# Patient Record
Sex: MALE | Race: WHITE | NOT HISPANIC OR LATINO | URBAN - METROPOLITAN AREA
[De-identification: names, ages, dates, MRNs, and addresses within clinical notes are randomized per-mention and may not be internally consistent; named-entity substitution may affect disease eponyms.]

---

## 2018-04-02 ENCOUNTER — EMERGENCY (EMERGENCY)
Facility: HOSPITAL | Age: 63
LOS: 1 days | Discharge: HOME | End: 2018-04-02
Attending: EMERGENCY MEDICINE | Admitting: EMERGENCY MEDICINE
Payer: COMMERCIAL

## 2018-04-02 VITALS
TEMPERATURE: 98 F | HEART RATE: 98 BPM | WEIGHT: 240.08 LBS | OXYGEN SATURATION: 97 % | HEIGHT: 71 IN | DIASTOLIC BLOOD PRESSURE: 99 MMHG | RESPIRATION RATE: 16 BRPM | SYSTOLIC BLOOD PRESSURE: 179 MMHG

## 2018-04-02 VITALS
RESPIRATION RATE: 16 BRPM | OXYGEN SATURATION: 96 % | HEART RATE: 65 BPM | DIASTOLIC BLOOD PRESSURE: 92 MMHG | TEMPERATURE: 98 F | SYSTOLIC BLOOD PRESSURE: 149 MMHG

## 2018-04-02 DIAGNOSIS — Z98.890 OTHER SPECIFIED POSTPROCEDURAL STATES: Chronic | ICD-10-CM

## 2018-04-02 PROCEDURE — 73562 X-RAY EXAM OF KNEE 3: CPT | Mod: 26,RT

## 2018-04-02 PROCEDURE — 99283 EMERGENCY DEPT VISIT LOW MDM: CPT | Mod: 25

## 2018-04-02 PROCEDURE — 73562 X-RAY EXAM OF KNEE 3: CPT

## 2018-04-02 PROCEDURE — 99284 EMERGENCY DEPT VISIT MOD MDM: CPT

## 2018-04-02 RX ORDER — IBUPROFEN 200 MG
600 TABLET ORAL ONCE
Qty: 0 | Refills: 0 | Status: COMPLETED | OUTPATIENT
Start: 2018-04-02 | End: 2018-04-02

## 2018-04-02 RX ADMIN — Medication 600 MILLIGRAM(S): at 10:20

## 2018-04-02 RX ADMIN — Medication 600 MILLIGRAM(S): at 11:00

## 2018-04-02 NOTE — ED PROVIDER NOTE - PROGRESS NOTE DETAILS
xray reviewed. results reviewed with pt including abnormal results. pt given a copy of results. pt without tenderness over fibula. pt advised he is going to follow up with ortho today in [facility]. pt advised to RICE, pt advised to take motrin for pain. pt denies cane or knee immobilizer.  All questions answered and concerns addressed. pt verbalized understanding and agreement with plan and dx. pt advised to follow up with PMD. pt advised to return to ed for worsening symptoms including fever, cp, sob. will dc.

## 2018-04-02 NOTE — ED PROVIDER NOTE - MEDICAL DECISION MAKING DETAILS
xray and ibuprofen. pt advised he must follow up with ortho as this is a probable ligamentous injury.

## 2018-04-02 NOTE — ED PROVIDER NOTE - ATTENDING CONTRIBUTION TO CARE
I, Dr Kaur, have personally performed a face to face diagnostic evaluation on this patient with the PA/NP. I have reviewed the PA/NP's note and agree with the history, Physical exam and plan of care, As per history pt is a 61yo male with pmhx of R arthroscopic knee surgery c/o knee pain x 4 days. pt reports at work he was stepping up into a truck and his R foot got caught in the grate causing right knee pain. pt reports non radiating burning pain rated 4/10 worse when sitting and sleeping, better with walking. pt used ice and took aleve for symptoms causing minimal relief. pt reports previous hx of knee surgery in this extremity. pt denies fever, cp, sob, numbness or tingling of extremity. nkda Review of Xray negative for fracture will discharge to fallow up with Orthopedic.

## 2018-04-02 NOTE — ED PROVIDER NOTE - PHYSICAL EXAMINATION
MS R LE: +RIGHT KNEE WITH MINIMAL SWELLING WITHOUT EFFUSION. LATERAL SUPERIOR KNEE TTP. FROM OF EXTREMITY. SENSATION GROSSLY INTACT. NO JOINT LAXITY   PV:+ 2 DP. CAP REFILL < 2 SECONDS. NO CALF SWELLING. NO CALF TENDERNESS

## 2018-04-02 NOTE — ED ADULT NURSE NOTE - OBJECTIVE STATEMENT
Patient walked into ER c/o "pain to lateral aspect of right knee" injured 4 days ago when he climbed into a big truck.  Burning sensation noted, patient walking without any deficit.

## 2018-04-02 NOTE — ED PROVIDER NOTE - CARE PLAN
Principal Discharge DX:	Knee pain  Secondary Diagnosis:	Sprain of collateral ligament of right knee, initial encounter

## 2018-04-02 NOTE — ED PROVIDER NOTE - OBJECTIVE STATEMENT
pt is a 63yo male with pmhx of R arthroscopic knee surgery c/o knee pain x 4 days. pt reports at work he was stepping up into a truck and pt is a 63yo male with pmhx of R arthroscopic knee surgery c/o knee pain x 4 days. pt reports at work he was stepping up into a truck and his R foot got caught in the grate causing right knee pain. pt reports non radiating burning pain rated 4/10 worse when sitting and sleeping, better with walking. pt used ice and took aleve for symptoms causing minimal relief. pt reports previous hx of knee surgery in this extremity. pt denies fever, cp, sob, numbness or tingling of extremity. nkda

## 2021-02-19 ENCOUNTER — INPATIENT (INPATIENT)
Facility: HOSPITAL | Age: 66
LOS: 0 days | Discharge: HOME | DRG: 185 | End: 2021-02-19
Attending: SURGERY | Admitting: SURGERY
Payer: COMMERCIAL

## 2021-02-19 VITALS
OXYGEN SATURATION: 97 % | DIASTOLIC BLOOD PRESSURE: 93 MMHG | TEMPERATURE: 97 F | RESPIRATION RATE: 20 BRPM | SYSTOLIC BLOOD PRESSURE: 178 MMHG | HEART RATE: 70 BPM

## 2021-02-19 VITALS
SYSTOLIC BLOOD PRESSURE: 141 MMHG | OXYGEN SATURATION: 96 % | DIASTOLIC BLOOD PRESSURE: 67 MMHG | HEART RATE: 56 BPM | RESPIRATION RATE: 18 BRPM

## 2021-02-19 DIAGNOSIS — S22.42XA MULTIPLE FRACTURES OF RIBS, LEFT SIDE, INITIAL ENCOUNTER FOR CLOSED FRACTURE: ICD-10-CM

## 2021-02-19 DIAGNOSIS — Z98.890 OTHER SPECIFIED POSTPROCEDURAL STATES: Chronic | ICD-10-CM

## 2021-02-19 LAB
ALBUMIN SERPL ELPH-MCNC: 4 G/DL — SIGNIFICANT CHANGE UP (ref 3.3–5.2)
ALP SERPL-CCNC: 67 U/L — SIGNIFICANT CHANGE UP (ref 40–120)
ALT FLD-CCNC: 21 U/L — SIGNIFICANT CHANGE UP
ANION GAP SERPL CALC-SCNC: 8 MMOL/L — SIGNIFICANT CHANGE UP (ref 5–17)
APTT BLD: 33.8 SEC — SIGNIFICANT CHANGE UP (ref 27.5–35.5)
AST SERPL-CCNC: 26 U/L — SIGNIFICANT CHANGE UP
BASOPHILS # BLD AUTO: 0.11 K/UL — SIGNIFICANT CHANGE UP (ref 0–0.2)
BASOPHILS NFR BLD AUTO: 1.1 % — SIGNIFICANT CHANGE UP (ref 0–2)
BILIRUB SERPL-MCNC: 1.3 MG/DL — SIGNIFICANT CHANGE UP (ref 0.4–2)
BLOOD GAS COMMENTS ARTERIAL: SIGNIFICANT CHANGE UP
BUN SERPL-MCNC: 21 MG/DL — HIGH (ref 8–20)
CALCIUM SERPL-MCNC: 8.9 MG/DL — SIGNIFICANT CHANGE UP (ref 8.6–10.2)
CHLORIDE SERPL-SCNC: 105 MMOL/L — SIGNIFICANT CHANGE UP (ref 98–107)
CO2 SERPL-SCNC: 26 MMOL/L — SIGNIFICANT CHANGE UP (ref 22–29)
CREAT SERPL-MCNC: 1.04 MG/DL — SIGNIFICANT CHANGE UP (ref 0.5–1.3)
EOSINOPHIL # BLD AUTO: 0.27 K/UL — SIGNIFICANT CHANGE UP (ref 0–0.5)
EOSINOPHIL NFR BLD AUTO: 2.6 % — SIGNIFICANT CHANGE UP (ref 0–6)
GAS PNL BLDA: SIGNIFICANT CHANGE UP
GLUCOSE SERPL-MCNC: 109 MG/DL — HIGH (ref 70–99)
HCO3 BLDA-SCNC: 24 MMOL/L — SIGNIFICANT CHANGE UP (ref 20–26)
HCT VFR BLD CALC: 45.9 % — SIGNIFICANT CHANGE UP (ref 39–50)
HGB BLD-MCNC: 15.6 G/DL — SIGNIFICANT CHANGE UP (ref 13–17)
HOROWITZ INDEX BLDA+IHG-RTO: SIGNIFICANT CHANGE UP
IMM GRANULOCYTES NFR BLD AUTO: 0.3 % — SIGNIFICANT CHANGE UP (ref 0–1.5)
INR BLD: 1.09 RATIO — SIGNIFICANT CHANGE UP (ref 0.88–1.16)
LYMPHOCYTES # BLD AUTO: 1.04 K/UL — SIGNIFICANT CHANGE UP (ref 1–3.3)
LYMPHOCYTES # BLD AUTO: 10 % — LOW (ref 13–44)
MCHC RBC-ENTMCNC: 30.6 PG — SIGNIFICANT CHANGE UP (ref 27–34)
MCHC RBC-ENTMCNC: 34 GM/DL — SIGNIFICANT CHANGE UP (ref 32–36)
MCV RBC AUTO: 90.2 FL — SIGNIFICANT CHANGE UP (ref 80–100)
MONOCYTES # BLD AUTO: 1.08 K/UL — HIGH (ref 0–0.9)
MONOCYTES NFR BLD AUTO: 10.4 % — SIGNIFICANT CHANGE UP (ref 2–14)
NEUTROPHILS # BLD AUTO: 7.84 K/UL — HIGH (ref 1.8–7.4)
NEUTROPHILS NFR BLD AUTO: 75.6 % — SIGNIFICANT CHANGE UP (ref 43–77)
PCO2 BLDA: 40 MMHG — SIGNIFICANT CHANGE UP (ref 35–45)
PH BLDA: 7.4 — SIGNIFICANT CHANGE UP (ref 7.35–7.45)
PLATELET # BLD AUTO: 286 K/UL — SIGNIFICANT CHANGE UP (ref 150–400)
PO2 BLDA: 70 MMHG — LOW (ref 83–108)
POTASSIUM SERPL-MCNC: 4.4 MMOL/L — SIGNIFICANT CHANGE UP (ref 3.5–5.3)
POTASSIUM SERPL-SCNC: 4.4 MMOL/L — SIGNIFICANT CHANGE UP (ref 3.5–5.3)
PROT SERPL-MCNC: 7.2 G/DL — SIGNIFICANT CHANGE UP (ref 6.6–8.7)
PROTHROM AB SERPL-ACNC: 12.6 SEC — SIGNIFICANT CHANGE UP (ref 10.6–13.6)
RBC # BLD: 5.09 M/UL — SIGNIFICANT CHANGE UP (ref 4.2–5.8)
RBC # FLD: 13 % — SIGNIFICANT CHANGE UP (ref 10.3–14.5)
SAO2 % BLDA: 95 % — SIGNIFICANT CHANGE UP (ref 95–99)
SARS-COV-2 IGG SERPL QL IA: NEGATIVE — SIGNIFICANT CHANGE UP
SARS-COV-2 IGM SERPL IA-ACNC: 0.09 INDEX — SIGNIFICANT CHANGE UP
SARS-COV-2 RNA SPEC QL NAA+PROBE: SIGNIFICANT CHANGE UP
SODIUM SERPL-SCNC: 139 MMOL/L — SIGNIFICANT CHANGE UP (ref 135–145)
TROPONIN T SERPL-MCNC: <0.01 NG/ML — SIGNIFICANT CHANGE UP (ref 0–0.06)
WBC # BLD: 10.37 K/UL — SIGNIFICANT CHANGE UP (ref 3.8–10.5)
WBC # FLD AUTO: 10.37 K/UL — SIGNIFICANT CHANGE UP (ref 3.8–10.5)

## 2021-02-19 PROCEDURE — 72192 CT PELVIS W/O DYE: CPT

## 2021-02-19 PROCEDURE — 99284 EMERGENCY DEPT VISIT MOD MDM: CPT

## 2021-02-19 PROCEDURE — 72192 CT PELVIS W/O DYE: CPT | Mod: 26,MA

## 2021-02-19 PROCEDURE — 85610 PROTHROMBIN TIME: CPT

## 2021-02-19 PROCEDURE — 84484 ASSAY OF TROPONIN QUANT: CPT

## 2021-02-19 PROCEDURE — 73130 X-RAY EXAM OF HAND: CPT

## 2021-02-19 PROCEDURE — 99239 HOSP IP/OBS DSCHRG MGMT >30: CPT

## 2021-02-19 PROCEDURE — 71045 X-RAY EXAM CHEST 1 VIEW: CPT | Mod: 26

## 2021-02-19 PROCEDURE — G1004: CPT

## 2021-02-19 PROCEDURE — 82803 BLOOD GASES ANY COMBINATION: CPT

## 2021-02-19 PROCEDURE — 96374 THER/PROPH/DIAG INJ IV PUSH: CPT

## 2021-02-19 PROCEDURE — 36415 COLL VENOUS BLD VENIPUNCTURE: CPT

## 2021-02-19 PROCEDURE — U0005: CPT

## 2021-02-19 PROCEDURE — 96375 TX/PRO/DX INJ NEW DRUG ADDON: CPT

## 2021-02-19 PROCEDURE — 71250 CT THORAX DX C-: CPT

## 2021-02-19 PROCEDURE — 71045 X-RAY EXAM CHEST 1 VIEW: CPT

## 2021-02-19 PROCEDURE — 86769 SARS-COV-2 COVID-19 ANTIBODY: CPT

## 2021-02-19 PROCEDURE — 85025 COMPLETE CBC W/AUTO DIFF WBC: CPT

## 2021-02-19 PROCEDURE — 73030 X-RAY EXAM OF SHOULDER: CPT

## 2021-02-19 PROCEDURE — 80053 COMPREHEN METABOLIC PANEL: CPT

## 2021-02-19 PROCEDURE — 85730 THROMBOPLASTIN TIME PARTIAL: CPT

## 2021-02-19 PROCEDURE — 99285 EMERGENCY DEPT VISIT HI MDM: CPT | Mod: 25

## 2021-02-19 PROCEDURE — U0003: CPT

## 2021-02-19 PROCEDURE — 36600 WITHDRAWAL OF ARTERIAL BLOOD: CPT

## 2021-02-19 PROCEDURE — 93005 ELECTROCARDIOGRAM TRACING: CPT

## 2021-02-19 PROCEDURE — 73130 X-RAY EXAM OF HAND: CPT | Mod: 26,LT

## 2021-02-19 PROCEDURE — 73030 X-RAY EXAM OF SHOULDER: CPT | Mod: 26,LT

## 2021-02-19 PROCEDURE — 99053 MED SERV 10PM-8AM 24 HR FAC: CPT

## 2021-02-19 PROCEDURE — 93010 ELECTROCARDIOGRAM REPORT: CPT

## 2021-02-19 PROCEDURE — 71250 CT THORAX DX C-: CPT | Mod: 26,MF

## 2021-02-19 RX ORDER — IBUPROFEN 200 MG
600 TABLET ORAL ONCE
Refills: 0 | Status: DISCONTINUED | OUTPATIENT
Start: 2021-02-19 | End: 2021-02-19

## 2021-02-19 RX ORDER — ENOXAPARIN SODIUM 100 MG/ML
30 INJECTION SUBCUTANEOUS EVERY 12 HOURS
Refills: 0 | Status: DISCONTINUED | OUTPATIENT
Start: 2021-02-19 | End: 2021-02-19

## 2021-02-19 RX ORDER — ACETAMINOPHEN 500 MG
3 TABLET ORAL
Qty: 0 | Refills: 0 | DISCHARGE
Start: 2021-02-19

## 2021-02-19 RX ORDER — SENNA PLUS 8.6 MG/1
2 TABLET ORAL AT BEDTIME
Refills: 0 | Status: DISCONTINUED | OUTPATIENT
Start: 2021-02-19 | End: 2021-02-19

## 2021-02-19 RX ORDER — OXYCODONE AND ACETAMINOPHEN 5; 325 MG/1; MG/1
1 TABLET ORAL ONCE
Refills: 0 | Status: DISCONTINUED | OUTPATIENT
Start: 2021-02-19 | End: 2021-02-19

## 2021-02-19 RX ORDER — ONDANSETRON 8 MG/1
4 TABLET, FILM COATED ORAL ONCE
Refills: 0 | Status: COMPLETED | OUTPATIENT
Start: 2021-02-19 | End: 2021-02-19

## 2021-02-19 RX ORDER — OXYCODONE HYDROCHLORIDE 5 MG/1
5 TABLET ORAL EVERY 4 HOURS
Refills: 0 | Status: DISCONTINUED | OUTPATIENT
Start: 2021-02-19 | End: 2021-02-19

## 2021-02-19 RX ORDER — LOSARTAN POTASSIUM 100 MG/1
25 TABLET, FILM COATED ORAL DAILY
Refills: 0 | Status: DISCONTINUED | OUTPATIENT
Start: 2021-02-19 | End: 2021-02-19

## 2021-02-19 RX ORDER — MORPHINE SULFATE 50 MG/1
2 CAPSULE, EXTENDED RELEASE ORAL ONCE
Refills: 0 | Status: DISCONTINUED | OUTPATIENT
Start: 2021-02-19 | End: 2021-02-19

## 2021-02-19 RX ORDER — MORPHINE SULFATE 50 MG/1
4 CAPSULE, EXTENDED RELEASE ORAL ONCE
Refills: 0 | Status: DISCONTINUED | OUTPATIENT
Start: 2021-02-19 | End: 2021-02-19

## 2021-02-19 RX ORDER — LIDOCAINE 4 G/100G
1 CREAM TOPICAL DAILY
Refills: 0 | Status: DISCONTINUED | OUTPATIENT
Start: 2021-02-19 | End: 2021-02-19

## 2021-02-19 RX ORDER — GABAPENTIN 400 MG/1
300 CAPSULE ORAL THREE TIMES A DAY
Refills: 0 | Status: DISCONTINUED | OUTPATIENT
Start: 2021-02-19 | End: 2021-02-19

## 2021-02-19 RX ORDER — KETOROLAC TROMETHAMINE 30 MG/ML
15 SYRINGE (ML) INJECTION EVERY 6 HOURS
Refills: 0 | Status: DISCONTINUED | OUTPATIENT
Start: 2021-02-19 | End: 2021-02-19

## 2021-02-19 RX ORDER — ACETAMINOPHEN 500 MG
975 TABLET ORAL EVERY 8 HOURS
Refills: 0 | Status: DISCONTINUED | OUTPATIENT
Start: 2021-02-19 | End: 2021-02-19

## 2021-02-19 RX ORDER — ONDANSETRON 8 MG/1
4 TABLET, FILM COATED ORAL EVERY 6 HOURS
Refills: 0 | Status: DISCONTINUED | OUTPATIENT
Start: 2021-02-19 | End: 2021-02-19

## 2021-02-19 RX ORDER — LIDOCAINE 4 G/100G
1 CREAM TOPICAL ONCE
Refills: 0 | Status: COMPLETED | OUTPATIENT
Start: 2021-02-19 | End: 2021-02-19

## 2021-02-19 RX ORDER — OXYCODONE HYDROCHLORIDE 5 MG/1
10 TABLET ORAL EVERY 4 HOURS
Refills: 0 | Status: DISCONTINUED | OUTPATIENT
Start: 2021-02-19 | End: 2021-02-19

## 2021-02-19 RX ADMIN — Medication 15 MILLIGRAM(S): at 05:26

## 2021-02-19 RX ADMIN — Medication 975 MILLIGRAM(S): at 05:27

## 2021-02-19 RX ADMIN — LIDOCAINE 1 PATCH: 4 CREAM TOPICAL at 03:21

## 2021-02-19 RX ADMIN — ENOXAPARIN SODIUM 30 MILLIGRAM(S): 100 INJECTION SUBCUTANEOUS at 09:16

## 2021-02-19 RX ADMIN — GABAPENTIN 300 MILLIGRAM(S): 400 CAPSULE ORAL at 05:27

## 2021-02-19 RX ADMIN — LOSARTAN POTASSIUM 25 MILLIGRAM(S): 100 TABLET, FILM COATED ORAL at 05:27

## 2021-02-19 RX ADMIN — MORPHINE SULFATE 4 MILLIGRAM(S): 50 CAPSULE, EXTENDED RELEASE ORAL at 03:29

## 2021-02-19 RX ADMIN — ONDANSETRON 4 MILLIGRAM(S): 8 TABLET, FILM COATED ORAL at 03:28

## 2021-02-19 NOTE — H&P ADULT - NSHPPHYSICALEXAM_GEN_ALL_CORE
Constitutional: Well-developed well nourished Male in no acute distress  HEENT: Head is normocephalic and atraumatic, maxillofacial structures stable, no blood or discharge from nares or oral cavity, no howard sign / racoon eyes, EOMI b/l, pupils [2 ]mm round and reactive to light b/l, no active drainage or redness  Neck: trachea midline, no midline tenderness  Respiratory: Breath sounds CTA b/l respirations are unlabored, no accessory muscle use, no conversational dyspnea  Cardiovascular: Regular rate & rhythm, +S1, S1, Chest wall is non-tender to palpation, no subQ emphysema or crepitus palpated  Gastrointestinal: Abdomen soft, non-tender, non-distended, no rebound tenderness / guarding, no ecchymosis or external signs of abdominal trauma  Musculoskeletal: moving all extremities spontaneously, point tenderness of left hip at proximal femur, left shoulder tenderness and mildly restricted ROM, left posterior chest wall tenderness, no deformity noted to upper or lower extremities b/l  Pelvis: stable  Vascular: 2+ radial, femoral, and DP pulses b/l  Neurological: GCS: 15 (4/5/6). A&O x 3; no gross sensory / motor / coordination deficits  Musculoskeletal: 5/5 strength of upper and lower extremities b/l  Neuropsinal: no C/T/LS spine tenderness to palpation, no step-offs or signs of external trauma to the back

## 2021-02-19 NOTE — ED PROVIDER NOTE - PHYSICAL EXAMINATION
Spine appears normal  No mid line spine C/T/L spine TTP ,   left shoulder countor Wnl  Rom with some pain.   left hand mild ecchymosis along with 5th metacarpal no bony TTP. no bony TTP over the left wrist  left hip mild ecchymosis at ant lateral , walking WNL slow gait . ROM of the hip grossly intact

## 2021-02-19 NOTE — ED ADULT NURSE NOTE - OBJECTIVE STATEMENT
Pt in no apparent distress at this time. Airway patent, breathing spontaneous and nonlabored. Pt A&Ox3 resting in stretcher. Pt c/o  pain to left chest, hip, and collar bone 2* falling on icy sidewalk at 1400. pt c.o difficulty taking full breaths. denies hitting heads, neg LOC.

## 2021-02-19 NOTE — ED PROVIDER NOTE - CLINICAL SUMMARY MEDICAL DECISION MAKING FREE TEXT BOX
65 y.o male states no sig Pmh present in Er S.p fall yesterday after noon tripped over the ice . hit the left rib cage to the curb with pain on left shoulder and hand and pelvis     ct chest and pelvis , xray of shoulder and hand left - oxycodone , lido patch

## 2021-02-19 NOTE — DISCHARGE NOTE PROVIDER - HOSPITAL COURSE
Admission HPI: 65M with HTN presents as trauma consult. Consult called at 0400, patient seen at 0410. Patient s/p slip on ice and fall on patients left side with injury to left hip, left shoulder, left wrist, and left posterior chest wall. Patient denies head trauma, no LOC. No frequent history of falls, no blood thinners at home.    Hospital Course: CT scan on admission showed tiny left hemopneumothorax and left 5-7th rib fractures. Patient was admitted to the trauma service, placed on rib fracture protocol w/ emphasis on pain control & pulm toiletting. Patient with no deterioration of pulmonary status during admission. He is pulling >2L on incentive spirometer, breathing comfortably on room air, and pain well controlled. Admission HPI: 65M with HTN presents as trauma consult. Consult called at 0400, patient seen at 0410. Patient s/p slip on ice and fall on patients left side with injury to left hip, left shoulder, left wrist, and left posterior chest wall. Patient denies head trauma, no LOC. No frequent history of falls, no blood thinners at home.    Hospital Course: CT scan on admission showed tiny left hemopneumothorax and left 5-7th rib fractures. Patient was admitted to the trauma service, placed on rib fracture protocol w/ emphasis on pain control & pulm toiletting. Patient with no deterioration of pulmonary status. He is pulling >2L on incentive spirometer, breathing comfortably on room air, and pain minimal and well controlled. CXRs without worsening PTX. Patient is OOB ambulating and steady on his feet, tolerating diet. Stable for discharge home with outpatient follow-up. Discussed with patient at length that if he has new or worsening pain, new or worsening shortness of breath or any other new or concerning symptom to return to the ED immediately. He expressed verbal understanding of this and agreeable with plan.    Patient is advised to RETURN TO THE EMERGENCY DEPARTMENT for any of the following - worsening pain, fever/chills, nausea/vomiting, altered mental status, chest pain, shortness of breath, or any other new / worsening symptom.    Length of time preparing discharge > 30 minutes

## 2021-02-19 NOTE — ED ADULT TRIAGE NOTE - CHIEF COMPLAINT QUOTE
pt s.p fall this afternoon with left rib hip and wrist pain, pt states it hurts to take a deep breath and was unable to get comfortable to go to sleep.

## 2021-02-19 NOTE — ED PROVIDER NOTE - CARE PLAN
Principal Discharge DX:	Hip pain  Secondary Diagnosis:	Left shoulder pain, unspecified chronicity  Secondary Diagnosis:	Fall, initial encounter  Secondary Diagnosis:	Hand pain, left   Principal Discharge DX:	Closed fracture of multiple ribs of left side, initial encounter  Secondary Diagnosis:	Left shoulder pain, unspecified chronicity  Secondary Diagnosis:	Fall, initial encounter  Secondary Diagnosis:	Hand pain, left  Secondary Diagnosis:	Hip pain  Secondary Diagnosis:	Traumatic pneumothorax, initial encounter

## 2021-02-19 NOTE — DISCHARGE NOTE PROVIDER - NSDCMRMEDTOKEN_GEN_ALL_CORE_FT
losartan 25 mg oral tablet: 1 tab(s) orally once a day   acetaminophen 325 mg oral tablet: 3 tab(s) orally every 8 hours  losartan 25 mg oral tablet: 1 tab(s) orally once a day  oxycodone-acetaminophen 5 mg-325 mg oral tablet: 1 tab(s) orally every 6 hours, As Needed -for severe pain MDD:4

## 2021-02-19 NOTE — H&P ADULT - HISTORY OF PRESENT ILLNESS
65M with HTN presents as trauma consult. Consult called at 0400, patient seen at 0410. Patient s/p slip on ice and fall on patients left side with injury to left hip, left shoulder, left wrist, and left posterior chest wall. Patient denies head trauma, no LOC. No frequent history of falls, no blood thinners at home.    A: Protected, patient conversing   B: CTAB. Symmetrical chest rise  C: 2+ central (femoral) & peripheral pulses (Radial, DP)  D: GCS 15, MAEO, interacting. No mahad disability noted  E: No gross deformities on primary exposure    Vitals:  Temp:  97.4 HR: 70 BP:178/93      CXR: Negative for evidence of hemo/pneumothorax    CT imaging showing multiple rib fractures. PIC score on admission 8

## 2021-02-19 NOTE — ED PROVIDER NOTE - SECONDARY DIAGNOSIS.
Left shoulder pain, unspecified chronicity Fall, initial encounter Hand pain, left Traumatic pneumothorax, initial encounter Hip pain

## 2021-02-19 NOTE — H&P ADULT - ASSESSMENT
Patient is a 65 year old male with hypertension who presents with left sided rib fractures and pneumothorax on the left side.   - Admit to trauma service  -   - Pain control  - resume home meds  - ABG  - Repeat CXR  - PIC protocol. PIC score on admission 8  - PT/OT  - IS/OOB

## 2021-02-19 NOTE — ED ADULT NURSE REASSESSMENT NOTE - NS ED NURSE REASSESS COMMENT FT1
pt educated to use of incentive spirometer. pr also maintained 98% o2 on RA. per trauma, place pt on 2l nc.

## 2021-02-19 NOTE — ED PROVIDER NOTE - RESPIRATORY, MLM
Breath sounds clear and equal bilaterally. left side of the rib cage posterior aspect TTP around rib 10-11 no erythema or edema or rash or crepitus

## 2021-02-19 NOTE — DISCHARGE NOTE PROVIDER - NSFOLLOWUPCLINICS_GEN_ALL_ED_FT
[facility] Acute Care Surgery  Acute Care Surgery  [address] [place] [id number]  Phone: [phone]  Fax:   Follow Up Time:

## 2021-02-19 NOTE — ED PROVIDER NOTE - OBJECTIVE STATEMENT
65 y.o male states no sig Pmh present in Er and  c.o left side of the rib cage pain , left shoulder and hand and left pelvis S.p fall yesterday afternoon  . states he tripped on the ice and landed on the left side and hit the left rib cage to the curb. denies any head trauma or LOC . states he took the asa at home with not much help .  pt states it hurts to take a deep breath and was unable to get comfortable to go to sleep. pt states he is able to bear weight . no other compliant 65 y.o male states no sig Pmh present in Er and  c.o left side of the rib cage pain , left shoulder and hand and left pelvis S.p fall yesterday afternoon  . states he tripped on the ice and landed on the left side and hit the left rib cage and hip to the curb. denies any head trauma or LOC . states he took the asa at home with not much help .  pt states it hurts to take a deep breath and was unable to get comfortable to go to sleep. pt states he is able to bear weight . no other compliant

## 2021-02-19 NOTE — H&P ADULT - ATTENDING COMMENTS
64 yo M with PMH of HTN presents s/p fall where he suffered left 5,6,7 rib fractures and occult pneumothorax.  AAOx3, GCS 15, PIC 7/8  Mild left chest wall tenderness  repeat CXR no PTX  Plan  1. Admit to trauma service for pain control   2. PIC protocol  3. PT/OT - mobility /OOB    code [id number]

## 2021-02-19 NOTE — ED PROVIDER NOTE - ATTENDING CONTRIBUTION TO CARE
Fall on the ice with left sided chest pain and tenderness, hemodynamically stable, normal work of breathing with no hypoxia. 3 rib fracturs on left with small hemopneumothorax. Labs otherwise stable, no other injuries identified on labs or imaging. Trauma surgery will admit for observation and intervention if necessary.

## 2021-02-19 NOTE — H&P ADULT - NSHPLABSRESULTS_GEN_ALL_CORE
LABS:                          15.6   10.37 )-----------( 286      ( 19 Feb 2021 03:45 )             45.9     02-19    139  |  105  |  21.0<H>  ----------------------------<  109<H>  4.4   |  26.0  |  1.04    Ca    8.9      19 Feb 2021 03:45    TPro  7.2  /  Alb  4.0  /  TBili  1.3  /  DBili  x   /  AST  26  /  ALT  21  /  AlkPhos  67  02-19    PT/INR - ( 19 Feb 2021 03:45 )   PT: 12.6 sec;   INR: 1.09 ratio         PTT - ( 19 Feb 2021 03:45 )  PTT:33.8 sec

## 2021-02-19 NOTE — ED PROVIDER NOTE - PROGRESS NOTE DETAILS
informed by attending + 3 rib fracture  and small pneumothorax   labs and Ekg and ABG ordered . ACS called by attending . made the pt aware of the injury pt is seen by trauma team will admit to trauma team

## 2021-02-19 NOTE — DISCHARGE NOTE PROVIDER - NSDCCPCAREPLAN_GEN_ALL_CORE_FT
PRINCIPAL DISCHARGE DIAGNOSIS  Diagnosis: Closed fracture of multiple ribs of left side, initial encounter  Assessment and Plan of Treatment: Follow up: Please call and make an appointment with the Acute Care Surgery [facility] 10-14 days after discharge. Also, please call and make an appointment with your primary care physician as per your usual schedule.   Activity: May return to normal activities as tolerated, however refrain from heavy lifting. It is recommended that you DO NOT go on an airplane or do anything that involves flying at high altitudes. DO NOT smoke cigarettes, participate in high impact activities, or go scuba diving at this time - doing so would increase your risk of getting another pneumothroax.   Diet: May continue regular diet.  Medications: Please take all medications listed on your discharge paperwork as prescribed. Pain medication has been prescribed for you. Please, take it as it has been prescribed, do not drive or operate heavy machinery while taking narcotics.  You are encouraged to take over-the-counter tylenol and/or ibuprofen for pain relief when you feel your pain no longer warrants the use of narcotic pain medications, however DO NOT TAKE percocet and tylenol at the same time as they contain the same active ingredient (acetaminophen). Take only percocet OR tylenol.  Patient is advised to RETURN TO THE EMERGENCY DEPARTMENT for any of the following - worsening pain, fever/chills, nausea/vomiting, altered mental status, chest pain, shortness of breath, or any other new / worsening symptom.      SECONDARY DISCHARGE DIAGNOSES  Diagnosis: Traumatic pneumothorax, initial encounter  Assessment and Plan of Treatment:

## 2021-02-21 RX ORDER — LOSARTAN POTASSIUM 100 MG/1
1 TABLET, FILM COATED ORAL
Qty: 0 | Refills: 0 | DISCHARGE

## 2021-07-29 NOTE — ED ADULT NURSE NOTE - CHIEF COMPLAINT QUOTE
Patient:    Mayur Mobley is a 71 year old male  Seen on July 29, 2021    Subjective:    Nursing notes reviewed and accepted    Medications:    Current Outpatient Medications   Medication   • verapamil (CALAN SR) 240 MG CR tablet   • atorvastatin (LIPITOR) 40 MG tablet   • metFORMIN (GLUCOPHAGE-XR) 500 MG 24 hr tablet   • aspirin 81 MG chewable tablet   • topiramate (TOPAMAX) 50 MG tablet   • loratadine (CLARITIN) 10 MG tablet   • albuterol (Ventolin HFA) 108 (90 Base) MCG/ACT inhaler   • azelastine (ASTELIN) 0.1 % nasal spray   • Ascorbic Acid (VITAMIN C) 100 MG tablet   • VITAMIN D, CHOLECALCIFEROL, PO   • fluticasone (FLONASE) 50 MCG/ACT nasal spray   • Multiple Vitamin (MULTI-VITAMINS) Tab   • Cyanocobalamin (VITAMIN B-12) 1000 MCG sublingual tablet   • cetirizine (ZYRTEC ALLERGY) 10 MG tablet   • Coenzyme Q10 (CO Q 10) 100 MG Cap   • famotidine (PEPCID) 20 MG tablet   • Lancets (OneTouch Delica Plus [id number]) Misc   • cephalexin (KEFLEX) 500 MG capsule   • OneTouch Verio test strip     No current facility-administered medications for this visit.       Allergies:     ALLERGIES:   Allergen Reactions   • Avelox [Moxifloxacin Hydrochloride] CARDIAC DISTURBANCES   • Benicar Hct [Olmesartan Medoxomil-Hctz] WEAKNESS     Hypotension   • Piroxicam DIZZINESS and CARDIAC DISTURBANCES   • Polidocanol Other (See Comments)     SIGNS of STROKE   • Supartz [Sodium Hyaluronate] DIZZINESS and SHORTNESS OF BREATH   • Depakote HIVES   • Albuterol HIVES     Hives on hands during albuterol neb-went away when stopped. Albuterol mdi and xopenex hhn ok   • Budesonide-Formoterol Fumarate Other (See Comments)     Shaky, worsened breathing   • Meloxicam SWELLING     Also chest tightness and extremely tired.   • Olmesartan Other (See Comments)     Blood pressure dropped per patient, passed out.   • Warfarin SWELLING     Swelling on left leg per patient   • Terfenadine GI UPSET     Seldane, GI upset       Interval History:    This  rt knee injury office note has been dictated.    Objective:    Visit Vitals  /74   Pulse (!) 56   Resp 12       Alert in no distress  Oriented to person, place and time  Skin: clear with no noted lesions  Nodes: no significant adenopathy  HEENT: Sclerae and conjunctivae are clear  Right tympanic membrane appears normal  Left tympanic membrane appears normal  Nasal mucosa pink  Turbinates appear normal  Septum midline  No polyps or unusual secretions  Throat is clear without erythema or exudate  No sinus or facial tenderness  Neck: Supple  Lungs: Clear to auscultation  Heart: Regular rate and rhythm without murmer    Orders:    No orders of the defined types were placed in this encounter.      Impression:    1. Laryngopharyngeal reflux (LPR)    2. Mild intermittent asthma without complication
